# Patient Record
Sex: MALE | Race: WHITE | Employment: FULL TIME | URBAN - METROPOLITAN AREA
[De-identification: names, ages, dates, MRNs, and addresses within clinical notes are randomized per-mention and may not be internally consistent; named-entity substitution may affect disease eponyms.]

---

## 2017-03-21 PROCEDURE — 82570 ASSAY OF URINE CREATININE: CPT | Performed by: INTERNAL MEDICINE

## 2017-03-21 PROCEDURE — 82043 UR ALBUMIN QUANTITATIVE: CPT | Performed by: INTERNAL MEDICINE

## 2020-08-30 ENCOUNTER — HOSPITAL ENCOUNTER (OUTPATIENT)
Age: 50
Discharge: HOME | End: 2020-08-30
Payer: COMMERCIAL

## 2020-08-30 VITALS
TEMPERATURE: 98 F | OXYGEN SATURATION: 97 % | DIASTOLIC BLOOD PRESSURE: 90 MMHG | WEIGHT: 232 LBS | HEART RATE: 67 BPM | RESPIRATION RATE: 16 BRPM | BODY MASS INDEX: 32 KG/M2 | SYSTOLIC BLOOD PRESSURE: 137 MMHG

## 2020-08-30 DIAGNOSIS — S61.211A LACERATION OF LEFT INDEX FINGER WITHOUT FOREIGN BODY WITHOUT DAMAGE TO NAIL, INITIAL ENCOUNTER: Primary | ICD-10-CM

## 2020-08-30 PROCEDURE — 99202 OFFICE O/P NEW SF 15 MIN: CPT

## 2020-08-30 PROCEDURE — 99203 OFFICE O/P NEW LOW 30 MIN: CPT

## 2020-08-30 PROCEDURE — 12001 RPR S/N/AX/GEN/TRNK 2.5CM/<: CPT

## 2020-08-30 NOTE — ED INITIAL ASSESSMENT (HPI)
PATIENT ARRIVED AMBULATORY TO ROOM C/O A LACERATION TO THE 2ND DIGIT ON THE LEFT HAND. PATIENT STATES HE CUT HIMSELF WITH AN \"ANGLE \" APPROXIMATELY 1.5 HOURS AGO. NO ACTIVE BLEEDING. TETANUS VACCINATION UTD.

## 2020-08-30 NOTE — ED PROVIDER NOTES
Patient Seen in: [id number]      History   Patient presents with:  Laceration Abrasion    Stated Complaint: left hand injury    HPI    57-year-old male who is leftt-hand dominant presents for evaluation of an index finger Resp 16   Temp 98.2 °F (36.8 °C)   Temp src Temporal   SpO2 97 %   O2 Device None (Room air)       Current:/90   Pulse 67   Temp 98.2 °F (36.8 °C) (Temporal)   Resp 16   Wt 105.2 kg   SpO2 97%   BMI 31.91 kg/m²         Physical Exam  Vitals signs and MDM                   Disposition and Plan     Clinical Impression:  Laceration of left index finger without foreign body without damage to nail, initial encounter  (primary encounter diagnosis)    Disposition:  Discharge  8/30/2020  3:59 pm    Follow-up:

## 2021-10-20 PROBLEM — Z86.16 HISTORY OF 2019 NOVEL CORONAVIRUS DISEASE (COVID-19): Status: ACTIVE | Noted: 2021-10-20
